# Patient Record
Sex: FEMALE | Race: OTHER | NOT HISPANIC OR LATINO
[De-identification: names, ages, dates, MRNs, and addresses within clinical notes are randomized per-mention and may not be internally consistent; named-entity substitution may affect disease eponyms.]

---

## 2024-04-30 PROBLEM — Z00.00 ENCOUNTER FOR PREVENTIVE HEALTH EXAMINATION: Status: ACTIVE | Noted: 2024-04-30

## 2024-05-01 ENCOUNTER — APPOINTMENT (OUTPATIENT)
Dept: OBGYN | Facility: CLINIC | Age: 29
End: 2024-05-01
Payer: SELF-PAY

## 2024-05-01 ENCOUNTER — LABORATORY RESULT (OUTPATIENT)
Age: 29
End: 2024-05-01

## 2024-05-01 ENCOUNTER — OUTPATIENT (OUTPATIENT)
Dept: OUTPATIENT SERVICES | Facility: HOSPITAL | Age: 29
LOS: 1 days | End: 2024-05-01
Payer: SELF-PAY

## 2024-05-01 VITALS — WEIGHT: 141 LBS | SYSTOLIC BLOOD PRESSURE: 110 MMHG | DIASTOLIC BLOOD PRESSURE: 70 MMHG

## 2024-05-01 DIAGNOSIS — N76.0 ACUTE VAGINITIS: ICD-10-CM

## 2024-05-01 DIAGNOSIS — Z01.419 ENCOUNTER FOR GYNECOLOGICAL EXAMINATION (GENERAL) (ROUTINE) W/OUT ABNORMAL FINDINGS: ICD-10-CM

## 2024-05-01 PROCEDURE — 87591 N.GONORRHOEAE DNA AMP PROB: CPT

## 2024-05-01 PROCEDURE — 99203 OFFICE O/P NEW LOW 30 MIN: CPT

## 2024-05-01 PROCEDURE — G0463: CPT

## 2024-05-01 PROCEDURE — 87491 CHLMYD TRACH DNA AMP PROBE: CPT

## 2024-05-01 PROCEDURE — G0444 DEPRESSION SCREEN ANNUAL: CPT | Mod: 25

## 2024-05-01 NOTE — DISCUSSION/SUMMARY
[FreeTextEntry1] : 27yo - new pt to establish care - [ ]pap/ hpv collected - gardasil- completed - contraception- discussed options- desires Nexplanon- will schedule for insertion appt.  Discussed using condoms leading up to procedure.  - Depression screen- reviewed PHQ9- no clinical signs of depression. Will f/u for changes in mood/anxiety  RTC for Nexplanon Carroll Ryan, PAC

## 2024-05-01 NOTE — REASON FOR VISIT
[Initial] : an initial consultation for [[employer] ] : provided by [employer]   [Time Spent: ____ minutes] : Total time spent using  services: [unfilled] minutes. The patient's primary language is not English thus required  services. [Interpreters_IDNumber] : [id number] [TWNoteComboBox1] : Kyr[place]z

## 2024-05-01 NOTE — PHYSICAL EXAM
[Appropriately responsive] : appropriately responsive [Alert] : alert [No Acute Distress] : no acute distress [No Lymphadenopathy] : no lymphadenopathy [Soft] : soft [Non-tender] : non-tender [Non-distended] : non-distended [No HSM] : No HSM [No Lesions] : no lesions [No Mass] : no mass [Oriented x3] : oriented x3 [Examination Of The Breasts] : a normal appearance [No Masses] : no breast masses were palpable [Labia Majora] : normal [Labia Minora] : normal [Pink Rugae] : pink rugae [Normal] : normal [Normal Position] : in a normal position [Tenderness] : nontender [Enlarged ___ wks] : not enlarged [Uterine Adnexae] : normal

## 2024-05-01 NOTE — HISTORY OF PRESENT ILLNESS
[FreeTextEntry1] : 27yo  (LMP 23  ) presents as new pt to establish care.  pt without complaint.  Regular monthly menses.  Desires Nexplanon insertion.  Has used in the past   Obhx:  x1  ([facility]) Gynhx: denies abnl paps/stds/ cysts/fibroids.   (Last pap- in [facility]).   Gardasil- pt states she completed  Pmhx: denies Shx: shoulder sx Meds: none ALL: NKDA Sochx: denies tob/etoh/drugs.   Denies dv or abuse issues Famhx:  gm- colon ca  PHQ9: 7

## 2024-05-02 DIAGNOSIS — Z01.419 ENCOUNTER FOR GYNECOLOGICAL EXAMINATION (GENERAL) (ROUTINE) WITHOUT ABNORMAL FINDINGS: ICD-10-CM

## 2024-05-02 LAB
C TRACH RRNA SPEC QL NAA+PROBE: SIGNIFICANT CHANGE UP
N GONORRHOEA RRNA SPEC QL NAA+PROBE: SIGNIFICANT CHANGE UP
SPECIMEN SOURCE: SIGNIFICANT CHANGE UP

## 2024-05-04 LAB — CYTOLOGY SPEC DOC CYTO: SIGNIFICANT CHANGE UP

## 2024-05-15 ENCOUNTER — APPOINTMENT (OUTPATIENT)
Dept: OBGYN | Facility: CLINIC | Age: 29
End: 2024-05-15
Payer: COMMERCIAL

## 2024-05-15 ENCOUNTER — OUTPATIENT (OUTPATIENT)
Dept: OUTPATIENT SERVICES | Facility: HOSPITAL | Age: 29
LOS: 1 days | End: 2024-05-15
Payer: SELF-PAY

## 2024-05-15 VITALS — SYSTOLIC BLOOD PRESSURE: 110 MMHG | DIASTOLIC BLOOD PRESSURE: 64 MMHG

## 2024-05-15 DIAGNOSIS — Z30.017 ENCOUNTER FOR INITIAL PRESCRIPTION OF IMPLANTABLE SUBDERMAL CONTRACEPTIVE: ICD-10-CM

## 2024-05-15 DIAGNOSIS — N76.0 ACUTE VAGINITIS: ICD-10-CM

## 2024-05-15 PROCEDURE — 11981 INSERTION DRUG DLVR IMPLANT: CPT

## 2024-05-15 PROCEDURE — 81025 URINE PREGNANCY TEST: CPT

## 2024-05-15 PROCEDURE — T1013: CPT

## 2024-05-15 NOTE — PLAN
[FreeTextEntry1] : 29yo -  here for Nexplanon insertion - INT# [id number] (Kyrgyz) used - r/b/a reviewed including possible bleeding profiles 9prolonged bleeding as well as amennorhea) - back up contraception x 7 days - reviewed bandage instructions - good for 5yrs- remove/replace   Carroll Ryan, PAC

## 2024-05-16 DIAGNOSIS — Z30.017 ENCOUNTER FOR INITIAL PRESCRIPTION OF IMPLANTABLE SUBDERMAL CONTRACEPTIVE: ICD-10-CM
